# Patient Record
Sex: MALE | Race: WHITE | NOT HISPANIC OR LATINO | Employment: UNEMPLOYED | URBAN - METROPOLITAN AREA
[De-identification: names, ages, dates, MRNs, and addresses within clinical notes are randomized per-mention and may not be internally consistent; named-entity substitution may affect disease eponyms.]

---

## 2018-05-10 ENCOUNTER — OFFICE VISIT (OUTPATIENT)
Dept: INTERNAL MEDICINE | Facility: CLINIC | Age: 33
End: 2018-05-10
Payer: COMMERCIAL

## 2018-05-10 VITALS
HEIGHT: 74 IN | HEART RATE: 79 BPM | OXYGEN SATURATION: 95 % | BODY MASS INDEX: 25.8 KG/M2 | SYSTOLIC BLOOD PRESSURE: 126 MMHG | WEIGHT: 201 LBS | TEMPERATURE: 98 F | DIASTOLIC BLOOD PRESSURE: 80 MMHG

## 2018-05-10 DIAGNOSIS — H57.89 IRRITATION OF RIGHT EYE: Primary | ICD-10-CM

## 2018-05-10 DIAGNOSIS — Z00.00 ENCOUNTER FOR PREVENTATIVE ADULT HEALTH CARE EXAMINATION: ICD-10-CM

## 2018-05-10 PROCEDURE — 99201 PR OFFICE/OUTPT VISIT,NEW,LEVL I: CPT | Mod: ,,, | Performed by: INTERNAL MEDICINE

## 2018-05-10 RX ORDER — ERYTHROMYCIN 5 MG/G
OINTMENT OPHTHALMIC EVERY 8 HOURS
Qty: 1 TUBE | Refills: 0 | Status: SHIPPED | OUTPATIENT
Start: 2018-05-10

## 2018-05-10 NOTE — PROGRESS NOTES
Subjective:       Patient ID: Blake Carlson is a 33 y.o. male.    Chief Complaint: Conjunctivitis (right eye pain and irritation (vision both eyes 20/20 , R/L eye 20/20))    Here for evaluation of right eye discomfort since last night.  Describes it as feeling scratchy.  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  He does not remember anything getting into his eye and did not have symptoms for several hours after he was done.  He has had arc flash in the past and it felt different.  He has an auto darkening helmet that he uses while welding and had it in place the entire time.  Was told this AM that a coworker's child has pink eye.  He remembers shaking the parent's hand yesterday but didn't interact with the child who was being held by the parent.  When he woke up this AM, his eye was crusted.  Has not had any discharge during the day today or noted any visual changes.        Review of Systems   Constitutional: Negative for chills, fatigue, fever and unexpected weight change.   HENT: Negative for congestion, hearing loss, postnasal drip, rhinorrhea, trouble swallowing and voice change.    Eyes: Positive for pain, discharge, redness and itching. Negative for photophobia and visual disturbance.   Respiratory: Negative for apnea, cough, choking, chest tightness, shortness of breath and wheezing.    Cardiovascular: Negative for chest pain, palpitations and leg swelling.   Gastrointestinal: Negative for abdominal pain, blood in stool, constipation, diarrhea, nausea, rectal pain and vomiting.   Endocrine: Negative for cold intolerance, heat intolerance, polydipsia and polyuria.   Genitourinary: Negative for decreased urine volume, difficulty urinating, discharge, dysuria, flank pain, frequency, genital sores, hematuria, testicular pain and urgency.   Musculoskeletal: Positive for back pain. Negative for arthralgias, gait problem, joint swelling, myalgias and  neck pain.   Skin: Negative for color change, rash and wound.   Allergic/Immunologic: Negative for environmental allergies and food allergies.   Neurological: Negative for dizziness, tremors, seizures, syncope, facial asymmetry, speech difficulty, weakness, light-headedness, numbness and headaches.   Hematological: Negative for adenopathy. Does not bruise/bleed easily.   Psychiatric/Behavioral: Negative for confusion, hallucinations, sleep disturbance and suicidal ideas. The patient is not nervous/anxious.      History reviewed. No pertinent past medical history. History reviewed. No pertinent surgical history.    Family History   Problem Relation Age of Onset    Thyroid disease Mother     Mental illness Mother     Hypertension Father     Diabetes Father     Heart attacks under age 50 Father        Social History     Social History    Marital status:      Spouse name: N/A    Number of children: N/A    Years of education: N/A     Occupational History    automotive repair      Social History Main Topics    Smoking status: Current Every Day Smoker     Packs/day: 1.00    Smokeless tobacco: Never Used    Alcohol use 6.0 oz/week     10 Cans of beer per week    Drug use: No    Sexual activity: Yes     Partners: Female     Birth control/ protection: Condom      Comment:      Other Topics Concern    None     Social History Narrative    Live with wife       Current Outpatient Prescriptions   Medication Sig Dispense Refill    erythromycin (ROMYCIN) ophthalmic ointment Place into the right eye every 8 (eight) hours. 1 Tube 0     No current facility-administered medications for this visit.        Review of patient's allergies indicates:  No Known Allergies  Objective:    HPI     Conjunctivitis    Additional comments: right eye pain and irritation (vision both eyes   20/20 , R/L eye 20/20)       Last edited by Shellie Garcia MA on 5/10/2018  1:21 PM. (History)      Blood pressure 126/80, pulse 79,  "temperature 98.2 °F (36.8 °C), temperature source Temporal, height 6' 2" (1.88 m), weight 91.2 kg (201 lb), SpO2 95 %. Body mass index is 25.81 kg/m².   Physical Exam   Constitutional: He appears well-developed.   Eyes: Conjunctivae, EOM and lids are normal. Pupils are equal, round, and reactive to light.   Cardiovascular: Normal rate, regular rhythm and normal heart sounds.    Pulmonary/Chest: Effort normal and breath sounds normal.   Nursing note and vitals reviewed.          Assessment:       1. Irritation of right eye    2. Encounter for preventative adult health care examination        Plan:       Blake was seen today for conjunctivitis.    Diagnoses and all orders for this visit:    Irritation of right eye  Comments:  I don't see evidence of conjunctivitis at this time.  Discussed s/s viral vs bacterial.  Rx given in case it worsens but would observe for now.   Orders:  -     erythromycin (ROMYCIN) ophthalmic ointment; Place into the right eye every 8 (eight) hours.    Encounter for preventative adult health care examination  -     Comprehensive metabolic panel; Future  -     Lipid panel; Future  -     Comprehensive metabolic panel  -     Lipid panel         "